# Patient Record
Sex: FEMALE | Race: WHITE | NOT HISPANIC OR LATINO | URBAN - METROPOLITAN AREA
[De-identification: names, ages, dates, MRNs, and addresses within clinical notes are randomized per-mention and may not be internally consistent; named-entity substitution may affect disease eponyms.]

---

## 2021-07-08 ENCOUNTER — OFFICE VISIT (OUTPATIENT)
Dept: URBAN - METROPOLITAN AREA CLINIC 35 | Facility: CLINIC | Age: 46
End: 2021-07-08

## 2021-07-08 ENCOUNTER — DASHBOARD ENCOUNTERS (OUTPATIENT)
Age: 46
End: 2021-07-08

## 2021-07-08 VITALS
WEIGHT: 206 LBS | SYSTOLIC BLOOD PRESSURE: 142 MMHG | HEIGHT: 65 IN | DIASTOLIC BLOOD PRESSURE: 102 MMHG | BODY MASS INDEX: 34.32 KG/M2

## 2021-07-08 PROBLEM — 16331000 HEARTBURN: Status: ACTIVE | Noted: 2021-07-08

## 2021-07-08 PROBLEM — 275978004 SCREENING FOR MALIGNANT NEOPLASM OF COLON: Status: ACTIVE | Noted: 2021-07-08

## 2021-07-08 PROBLEM — 271832001 FLATULENCE, ERUCTATION AND GAS PAIN: Status: ACTIVE | Noted: 2021-07-08

## 2021-07-08 RX ORDER — FAMOTIDINE 10 MG/1
1 TABLET AS NEEDED TABLET, FILM COATED ORAL TWICE A DAY
Status: ACTIVE | COMMUNITY

## 2021-07-08 RX ORDER — CLONAZEPAM 1 MG/1
1 TABLET TABLET ORAL ONCE A DAY
Status: ACTIVE | COMMUNITY

## 2021-07-08 RX ORDER — LEVOTHYROXINE SODIUM 0.05 MG/1
1 TABLET IN THE MORNING ON AN EMPTY STOMACH TABLET ORAL ONCE A DAY
Qty: 30 | Status: ACTIVE | COMMUNITY

## 2021-07-08 RX ORDER — BISMUTH SUBSALICYLATE 262MG/15ML
30 ML WITH MEALS AND AT BEDTIME SUSPENSION, ORAL (FINAL DOSE FORM) ORAL
Qty: 3600 | Status: ACTIVE | COMMUNITY

## 2021-07-08 RX ORDER — LOSARTAN POTASSIUM 50 MG/1
1 TABLET TABLET ORAL ONCE A DAY
Qty: 30 | Status: ACTIVE | COMMUNITY

## 2021-07-08 RX ORDER — PROMETHAZINE HYDROCHLORIDE 50 MG/ML
INJECTION INTRAMUSCULAR
Status: ACTIVE | COMMUNITY

## 2021-07-08 RX ORDER — MINOCYCLINE HYDROCHLORIDE 90 MG/1
AS DIRECTED TABLET, EXTENDED RELEASE ORAL
Status: ACTIVE | COMMUNITY

## 2021-07-08 RX ORDER — FAMOTIDINE 40 MG/1
1 TABLET TABLET, FILM COATED ORAL TWICE A DAY
Qty: 60 TABLET | Refills: 1 | OUTPATIENT
Start: 2021-07-08

## 2021-07-08 NOTE — EXAM-MIGRATED EXAMINATIONS
General Examination : Medical assistant was in the room during the examination;     GENERAL APPEARANCE: - alert, in no acute distress, well developed, well nourished;   HEAD: - normocephalic, atraumatic;   ORAL CAVITY: - mucosa moist;   THROAT: - clear;   NECK/THYROID: - neck supple, full range of motion, no cervical lymphadenopathy, no thyroid nodules, no thyromegaly, trachea midline;   HEART: - no murmurs, regular rate and rhythm, S1, S2 normal;   LUNGS: - clear to auscultation bilaterally, good air movement, no wheezes, rales, rhonchi;   ABDOMEN: - bowel sounds present, no masses palpable, no organomegaly , no rebound tenderness, soft, nontender, nondistended;

## 2021-07-08 NOTE — HPI-MIGRATED HPI
;   ;     Change in bowel habits : Patient complains of change in bowel habits. She states she has been experiencing symptoms since finishing Chemo . Patient states she has alternating stools sometimes watery and mushy other times formed . She states she has between 2-3 bowel movements a day. She denie rectal bleeding . ;   Abdominal swelling : Patient complains of abdominal swelling . Patient admits symptoms are more present all the time . Patient admits symptoms have been present since May 2021.  Patient denies taking any OTC medications to relieve symptoms. Patient describes symptoms as permanent distention, and discomfort. Patient denies having any previous breath test including H. Pylori or SIBO but did have H. Pylori blood test completed .    Patient denies having EGD in the past.    ;

## 2021-07-22 ENCOUNTER — OFFICE VISIT (OUTPATIENT)
Dept: URBAN - METROPOLITAN AREA CLINIC 35 | Facility: CLINIC | Age: 46
End: 2021-07-22

## 2021-07-23 ENCOUNTER — OFFICE VISIT (OUTPATIENT)
Dept: URBAN - METROPOLITAN AREA CLINIC 35 | Facility: CLINIC | Age: 46
End: 2021-07-23

## 2021-07-23 NOTE — HPI-MIGRATED HPI
;   ;     Change in bowel habits : Of last visit (07/08/2021)Patient complains of change in bowel habits. She states she has been experiencing symptoms since finishing Chemo . Patient states she has alternating stools sometimes watery and mushy other times formed . She states she has between 2-3 bowel movements a day. She denie rectal bleeding .;   Abdominal swelling : Of last visit (07/08/2021) Patient complains of abdominal swelling . Patient admits symptoms are more present all the time . Patient admits symptoms have been present since May 2021.  Patient denies taking any OTC medications to relieve symptoms. Patient describes symptoms as permanent distention, and discomfort. Patient denies having any previous breath test including H. Pylori or SIBO but did have H. Pylori blood test completed .    Patient denies having EGD in the past.;